# Patient Record
Sex: FEMALE | Race: WHITE | NOT HISPANIC OR LATINO | URBAN - METROPOLITAN AREA
[De-identification: names, ages, dates, MRNs, and addresses within clinical notes are randomized per-mention and may not be internally consistent; named-entity substitution may affect disease eponyms.]

---

## 2017-01-18 ENCOUNTER — IMPORTED ENCOUNTER (OUTPATIENT)
Dept: URBAN - METROPOLITAN AREA CLINIC 31 | Facility: CLINIC | Age: 82
End: 2017-01-18

## 2017-01-18 PROBLEM — H17.89: Noted: 2017-01-18

## 2017-01-18 PROBLEM — H25.813: Noted: 2017-01-18

## 2017-01-18 PROCEDURE — 92014 COMPRE OPH EXAM EST PT 1/>: CPT

## 2017-01-18 NOTE — PATIENT DISCUSSION
1.  S/P  Lasik: monitor2. Discussed the risks benefits alternatives and limitations of cataract surgery including infection bleeding loss of vision retinal tears detachment. The patient stated a full understanding and a desire to proceed with the procedure in both eyes. Refractive options were reviewed. Patient has elected to be optimized for distance vision in both eyes. The patient will still need glasses for reading and to possibly fine tune distance vision. 3. Return for an appointment in 1 month for cataract evaluation. with Dr. Evangelista Laughter.

## 2017-03-01 ENCOUNTER — IMPORTED ENCOUNTER (OUTPATIENT)
Dept: URBAN - METROPOLITAN AREA CLINIC 31 | Facility: CLINIC | Age: 82
End: 2017-03-01

## 2017-03-01 PROBLEM — H25.811: Noted: 2017-03-01

## 2017-03-01 PROBLEM — H17.89: Noted: 2017-03-01

## 2017-03-01 PROBLEM — H25.812: Noted: 2017-03-01

## 2017-03-01 PROCEDURE — 99214 OFFICE O/P EST MOD 30 MIN: CPT

## 2017-03-01 PROCEDURE — 92015 DETERMINE REFRACTIVE STATE: CPT

## 2017-03-01 NOTE — PATIENT DISCUSSION
1.  Cataract OD: Discussed the risks benefits alternatives and limitations of cataract surgery including infection bleeding loss of vision retinal tears detachment. The patient stated a full understanding and a desire to proceed with the procedure in the right eye. Refractive options reviewed. Patient understands IOL calcs are more difficult and that intraoperative ORA measurements will increase the success but does not guarantee not needing a refractive enhancement. Pt desired to schedule surgery OD with ORA guidance. 2. Combined Types of Cataract OS: Explained how cataracts can effect vision. Recommend clinical observation. The patient was advised to contact us if any change or worsening of vision. 3. S/P  Lasik: distance ou.4. Return for an appointment for [address] [place] [address] with Dr. Danyell Stephenson.

## 2017-03-22 ENCOUNTER — IMPORTED ENCOUNTER (OUTPATIENT)
Dept: URBAN - METROPOLITAN AREA CLINIC 31 | Facility: CLINIC | Age: 82
End: 2017-03-22

## 2017-03-22 PROBLEM — H25.811: Noted: 2017-03-22

## 2017-03-22 PROBLEM — H25.812: Noted: 2017-03-22

## 2017-03-22 PROBLEM — H17.89: Noted: 2017-03-22

## 2017-03-22 PROCEDURE — 92132 CPTRZD OPH DX IMG ANT SGM: CPT

## 2017-03-22 PROCEDURE — 76519 ECHO EXAM OF EYE: CPT

## 2017-03-22 NOTE — PATIENT DISCUSSION
1.  Cataract OD: Discussed the risks benefits alternatives and limitations of cataract surgery including infection bleeding loss of vision retinal tears detachment. The patient stated a full understanding and a desire to proceed with the procedure in the right eye. Refractive options reviewed. Patient understands IOL calcs are more difficult and that intraoperative ORA measurements will increase the success but does not guarantee not needing a refractive enhancement. Pt desired to schedule surgery OD with ORA guidance. 2. Combined Types of Cataract OS: Explained how cataracts can effect vision. Recommend clinical observation. The patient was advised to contact us if any change or worsening of vision. 3. S/P  Lasik: distance ou.

## 2017-04-11 ENCOUNTER — IMPORTED ENCOUNTER (OUTPATIENT)
Dept: URBAN - METROPOLITAN AREA CLINIC 31 | Facility: CLINIC | Age: 82
End: 2017-04-11

## 2017-04-11 PROBLEM — Z96.1: Noted: 2017-04-11

## 2017-04-11 PROCEDURE — 99024 POSTOP FOLLOW-UP VISIT: CPT

## 2017-04-11 NOTE — PATIENT DISCUSSION
1.  Post-Op Day #1 - Cataract Surgery Right Eye (OD) - doing well. Tears prn. Continue postop drops as directed. Call office with symptoms of pain redness or decreased vision in operative eye. 1 drop zylet instilled. 2. Return for an appointment in 1 week for post op exam. What to do with Dr. Denver Curb.

## 2017-04-18 ENCOUNTER — IMPORTED ENCOUNTER (OUTPATIENT)
Dept: URBAN - METROPOLITAN AREA CLINIC 31 | Facility: CLINIC | Age: 82
End: 2017-04-18

## 2017-04-18 PROBLEM — H25.813: Noted: 2017-04-18

## 2017-04-18 PROBLEM — H25.812: Noted: 2017-04-18

## 2017-04-18 PROBLEM — Z96.1: Noted: 2017-04-18

## 2017-04-18 PROBLEM — H17.89: Noted: 2017-04-18

## 2017-04-18 PROCEDURE — 99024 POSTOP FOLLOW-UP VISIT: CPT

## 2017-04-18 NOTE — PATIENT DISCUSSION
1.  Post-Op Week #1 - Cataract Surgery Right Eye (OD) - Intraocular lens stable and surgery very well healed. Patient to resume all normal activities. Finish postop drops as directed. Patient happy with uncorrected mono. 2. S/P  Lasik: monitor 3. Combined Types of Cataract OS: Explained how cataracts can effect vision. Recommend clinical observation. The patient was advised to contact us if any change or worsening of vision. 4. Return for an appointment in 6 months for comprehensive exam with Dr. Cara De La Garza. Leaving for up [place]SHUT[place] Corrigan. To call if any problems.

## 2017-11-16 ENCOUNTER — IMPORTED ENCOUNTER (OUTPATIENT)
Dept: URBAN - METROPOLITAN AREA CLINIC 31 | Facility: CLINIC | Age: 82
End: 2017-11-16

## 2017-11-16 PROBLEM — H25.812: Noted: 2017-11-16

## 2017-11-16 PROBLEM — Z96.1: Noted: 2017-11-16

## 2017-11-16 PROCEDURE — 92014 COMPRE OPH EXAM EST PT 1/>: CPT

## 2017-11-16 NOTE — PATIENT DISCUSSION
1. Combined Types of Cataract OS: Explained how cataracts can effect vision. Recommend clinical observation. The patient was advised to contact us if any change or worsening of vision. 2. Pseudophakia OD - IOL stable. Happy with near [place] uncorrected. Monitor. 3. Return for an appointment in 1 year for comprehensive exam. with Dr. Dwain Morrison.

## 2019-01-16 ENCOUNTER — IMPORTED ENCOUNTER (OUTPATIENT)
Dept: URBAN - METROPOLITAN AREA CLINIC 31 | Facility: CLINIC | Age: 84
End: 2019-01-16

## 2019-01-16 PROBLEM — Z96.1: Noted: 2019-01-16

## 2019-01-16 PROBLEM — H25.812: Noted: 2019-01-16

## 2019-01-16 PROCEDURE — 92014 COMPRE OPH EXAM EST PT 1/>: CPT

## 2019-01-16 NOTE — PATIENT DISCUSSION
1. Combined Types of Cataract OS: monitor2. Pseudophakia OD - IOL stable. Happy with near [place] uncorrected. Monitor. 3. Return for an appointment in 1 year for comprehensive exam. with Dr. Angie Serrano.

## 2020-01-17 ENCOUNTER — IMPORTED ENCOUNTER (OUTPATIENT)
Dept: URBAN - METROPOLITAN AREA CLINIC 31 | Facility: CLINIC | Age: 85
End: 2020-01-17

## 2020-01-17 PROBLEM — H25.812: Noted: 2020-01-17

## 2020-01-17 PROBLEM — Z96.1: Noted: 2020-01-17

## 2020-01-17 PROCEDURE — 92014 COMPRE OPH EXAM EST PT 1/>: CPT

## 2020-01-17 PROCEDURE — 92015 DETERMINE REFRACTIVE STATE: CPT

## 2020-01-17 NOTE — PATIENT DISCUSSION
1. Combined Types of Cataract OS: consult FP. 2. Pseudophakia OD - IOL stable. Monitor for changes in vision. 3. Return for an appointment in 1 month for cataract evaluation. with Dr. Terra Shanks.

## 2020-03-04 ENCOUNTER — IMPORTED ENCOUNTER (OUTPATIENT)
Dept: URBAN - METROPOLITAN AREA CLINIC 31 | Facility: CLINIC | Age: 85
End: 2020-03-04

## 2020-03-04 PROBLEM — H17.89: Noted: 2020-03-04

## 2020-03-04 PROBLEM — Z96.1: Noted: 2020-03-04

## 2020-03-04 PROBLEM — H25.812: Noted: 2020-03-04

## 2020-03-04 PROBLEM — H43.812: Noted: 2020-03-04

## 2020-03-04 PROCEDURE — 99213 OFFICE O/P EST LOW 20 MIN: CPT

## 2020-03-04 NOTE — PATIENT DISCUSSION
1.  Cataract OS: Discussed the risks benefits alternatives and limitations of cataract surgery including infection bleeding loss of vision retinal tears detachment. The patient stated a full understanding and a desire to proceed with the procedure in the left eye. Refractive options reviewed. Patient understands IOL calcs are more difficult and that intraoperative ORA measurements will increase the success but does not guarantee not needing a refractive enhancement. Pt desired to schedule surgery OS with ORA guidance. pore plano2. Pseudophakia OD - IOL stable. Monitor for changes in vision. 3. PVD OS: Patient was cautioned to call our office immediately if they experience a substantial change in their symptoms such as an increase in floaters persistent flashes loss of visual field (may appear as a shadow or a curtain) or decrease in visual acuity as these may indicate a retinal tear or detachment. If this is a new problem patient will need to return for re-examination  as determined by the [address] [place] [address]. S/P  Lasik: monovision OD reading OS distance5. Return for an appointment for [address] [place] [address] with Dr. Gia Faye.

## 2020-03-11 ENCOUNTER — IMPORTED ENCOUNTER (OUTPATIENT)
Dept: URBAN - METROPOLITAN AREA CLINIC 31 | Facility: CLINIC | Age: 85
End: 2020-03-11

## 2020-03-11 PROBLEM — H25.812: Noted: 2020-03-11

## 2020-03-11 PROCEDURE — 92136 OPHTHALMIC BIOMETRY: CPT

## 2020-03-11 NOTE — PATIENT DISCUSSION
The patient has elected to have a Toric IOL to correct astigmatism in the left eye. Intraoperative ORA measurements and Femtolaser assist will be performed. The possible need for refractive enhancement was reviewed. There is no guarantee of perfect uncorrected acuity.

## 2020-05-12 ENCOUNTER — IMPORTED ENCOUNTER (OUTPATIENT)
Dept: URBAN - METROPOLITAN AREA CLINIC 31 | Facility: CLINIC | Age: 85
End: 2020-05-12

## 2020-05-12 PROBLEM — Z96.1: Noted: 2020-05-12

## 2020-05-12 PROCEDURE — 99024 POSTOP FOLLOW-UP VISIT: CPT

## 2020-05-12 NOTE — PATIENT DISCUSSION
1.  Post-Op Day #1 - Cataract Surgery Left Eye (OS) - doing well. Tears prn. Continue postop drops as directed. Call office with symptoms of pain redness or decreased vision in operative eye.  1 drop of tobramycin instilled2. Return for an appointment in 1 week for post op exam. with Dr. Saleem Fish.

## 2020-05-20 ENCOUNTER — IMPORTED ENCOUNTER (OUTPATIENT)
Dept: URBAN - METROPOLITAN AREA CLINIC 31 | Facility: CLINIC | Age: 85
End: 2020-05-20

## 2020-05-20 PROBLEM — Z96.1: Noted: 2020-05-20

## 2020-05-20 PROCEDURE — 99024 POSTOP FOLLOW-UP VISIT: CPT

## 2020-05-20 NOTE — PATIENT DISCUSSION
1.  Post-Op Week #1 - Cataract Surgery Left Eye (OS) -  Intraocular lens stable and surgery very well healed. Patient to resume all normal activities. Finish postop drops as directed. Final Refraction given if necessary. Call with any problems. 2. Return for an appointment in 4 months for dilated fundus exam. with Dr. Grey Alvarez.

## 2020-10-21 ENCOUNTER — IMPORTED ENCOUNTER (OUTPATIENT)
Dept: URBAN - METROPOLITAN AREA CLINIC 31 | Facility: CLINIC | Age: 85
End: 2020-10-21

## 2020-10-21 PROBLEM — Z96.1: Noted: 2020-10-21

## 2020-10-21 PROBLEM — H43.813: Noted: 2020-10-21

## 2020-10-21 PROCEDURE — 99214 OFFICE O/P EST MOD 30 MIN: CPT

## 2020-10-21 NOTE — PATIENT DISCUSSION
1.  Pseudophakia OU - IOLs stable. Monitor for changes in vision. 2. PVD OU:  Patient was cautioned to call our office immediately if they experience a substantial change in their symptoms such as an increase in floaters persistent flashes loss of visual field (may appear as a shadow or a curtain) or decrease in visual acuity as these may indicate a retinal tear or detachment. If this is a new problem patient will need to return for re-examination  as determined by the [address]. Return for an appointment in 1 year for comprehensive exam. with Dr. Maura Moya.

## 2021-10-01 ENCOUNTER — OFFICE VISIT (OUTPATIENT)
Dept: URBAN - METROPOLITAN AREA CLINIC 121 | Facility: CLINIC | Age: 86
End: 2021-10-01

## 2021-10-21 ENCOUNTER — IMPORTED ENCOUNTER (OUTPATIENT)
Dept: URBAN - METROPOLITAN AREA CLINIC 31 | Facility: CLINIC | Age: 86
End: 2021-10-21

## 2021-10-21 PROBLEM — Z96.1: Noted: 2021-10-21

## 2021-10-21 PROBLEM — H43.813: Noted: 2021-10-21

## 2021-10-21 PROCEDURE — 99214 OFFICE O/P EST MOD 30 MIN: CPT

## 2021-10-21 NOTE — PATIENT DISCUSSION
1.  Pseudophakia OU - IOLs stable. Monitor for changes in vision. 2. PVD OU:  Monitor. 3. Return for an appointment in 1 year for comprehensive exam. with Dr. Angie Serrano.

## 2022-03-29 ENCOUNTER — OFFICE VISIT (OUTPATIENT)
Dept: URBAN - METROPOLITAN AREA CLINIC 63 | Facility: CLINIC | Age: 87
End: 2022-03-29

## 2022-04-02 ASSESSMENT — VISUAL ACUITY
OD_CC: 20/250
OS_CC: 20/30
OD_CC: 20/50
OD_SC: 20/40
OS_CC: 20/40
OS_CC: 20/40
OS_CC: 20/30-2
OS_CC: 20/30
OD_PH: SC 20/30 -2
OS_GLARE: 20/50-2MED
OD_CC: 20/25-2
OD_SC: 20/25
OS_CC: 20/30
OS_SC: 20/50
OU_SC: 20/40
OS_PH: SC 20/30 -2
OS_CC: 20/30-2
OS_CC: 20/40
OU_CC: 20/40
OD_SC: 20/30
OS_CC: 20/30-1
OS_CC: 20/40+2
OD_CC: 20/40+2
OD_SC: J116''
OD_CC: 20/30+2
OD_CC: 20/70-2
OD_SC: 20/40
OD_CC: 20/70
OU_CC: 20/30
OD_PH: SC 20/50
OD_SC: 20/30
OU_CC: 20/30
OD_SC: 20/25
OS_CC: 20/40-1
OD_GLARE: 20/50MED
OS_CC: 20/40
OS_PH: SC 20/30
OS_GLARE: 20/50MED
OD_SC: 20/30-3
OS_CC: 20/30

## 2022-04-02 ASSESSMENT — TONOMETRY
OD_IOP_MMHG: 11
OD_IOP_MMHG: 15
OS_IOP_MMHG: 13
OS_IOP_MMHG: 10
OS_IOP_MMHG: 10
OS_IOP_MMHG: 15
OS_IOP_MMHG: 7
OS_IOP_MMHG: 10
OS_IOP_MMHG: 11
OD_IOP_MMHG: 10
OD_IOP_MMHG: 12
OS_IOP_MMHG: 12
OS_IOP_MMHG: 13
OS_IOP_MMHG: 12
OS_IOP_MMHG: 12
OD_IOP_MMHG: 12
OD_IOP_MMHG: 12
OD_IOP_MMHG: 10
OD_IOP_MMHG: 10

## 2022-04-04 ENCOUNTER — LAB OUTSIDE AN ENCOUNTER (OUTPATIENT)
Dept: URBAN - METROPOLITAN AREA CLINIC 121 | Facility: CLINIC | Age: 87
End: 2022-04-04

## 2022-04-07 LAB
A/G RATIO: 2.3
ALBUMIN: (no result)
ALKALINE PHOSPHATASE: (no result)
ALT (SGPT): (no result)
AMBIG ABBREV CMP14 DEFAULT: (no result)
AST (SGOT): (no result)
BILIRUBIN, TOTAL: (no result)
BUN/CREATININE RATIO: 21
BUN: (no result)
C-REACTIVE PROTEIN, QUANT: (no result)
CALCIUM: (no result)
CARBON DIOXIDE, TOTAL: (no result)
CHLORIDE: (no result)
CREATININE: (no result)
EGFR: (no result)
ENDOMYSIAL ANTIBODY IGA: NEGATIVE
GLOBULIN, TOTAL: (no result)
GLUCOSE: (no result)
IMMUNOGLOBULIN A, QN, SERUM: (no result)
POTASSIUM: (no result)
PROTEIN, TOTAL: (no result)
SODIUM: (no result)
T-TRANSGLUTAMINASE (TTG) IGA: (no result)

## 2022-04-19 ENCOUNTER — OFFICE VISIT (OUTPATIENT)
Dept: URBAN - METROPOLITAN AREA CLINIC 63 | Facility: CLINIC | Age: 87
End: 2022-04-19

## 2022-07-09 ENCOUNTER — TELEPHONE ENCOUNTER (OUTPATIENT)
Dept: URBAN - METROPOLITAN AREA CLINIC 121 | Facility: CLINIC | Age: 87
End: 2022-07-09

## 2022-07-09 RX ORDER — DENOSUMAB 60 MG/ML
EVERY 6 MONTHS INJECTION SUBCUTANEOUS TAKE AS DIRECTED
Refills: 0 | OUTPATIENT
Start: 2022-03-29 | End: 2022-04-19

## 2022-07-09 RX ORDER — ROSUVASTATIN CALCIUM 40 MG/1
TABLET, FILM COATED ORAL
Refills: 0 | OUTPATIENT
Start: 2022-01-31 | End: 2022-03-29

## 2022-07-09 RX ORDER — ROSUVASTATIN CALCIUM 40 MG/1
TABLET, FILM COATED ORAL ONCE A DAY
Refills: 0 | OUTPATIENT
Start: 2022-03-29 | End: 2022-03-29

## 2022-07-09 RX ORDER — ANTIARTHRITIC COMBINATION NO.2 900 MG
TABLET ORAL ONCE A DAY
Refills: 0 | OUTPATIENT
Start: 2022-03-29 | End: 2022-04-19

## 2022-07-09 RX ORDER — ROSUVASTATIN CALCIUM 20 MG/1
TABLET, FILM COATED ORAL ONCE A DAY
Refills: 0 | OUTPATIENT
Start: 2022-03-29 | End: 2022-04-19

## 2022-07-09 RX ORDER — FLUTICASONE PROPIONATE 50 UG/1
SPRAY, METERED NASAL
Refills: 0 | OUTPATIENT
Start: 2022-02-15 | End: 2022-03-29

## 2022-07-09 RX ORDER — CETIRIZINE HYDROCHLORIDE 10 MG/1
TABLET, FILM COATED ORAL ONCE A DAY
Refills: 0 | OUTPATIENT
Start: 2022-03-29 | End: 2022-03-29

## 2022-07-09 RX ORDER — CETIRIZINE HYDROCHLORIDE 10 MG/1
TABLET, FILM COATED ORAL
Refills: 0 | OUTPATIENT
Start: 2022-02-15 | End: 2022-03-29

## 2022-07-09 RX ORDER — FLUTICASONE PROPIONATE 50 UG/1
SPRAY, METERED NASAL TAKE AS DIRECTED
Refills: 0 | OUTPATIENT
Start: 2022-03-29 | End: 2022-04-19

## 2022-07-10 ENCOUNTER — TELEPHONE ENCOUNTER (OUTPATIENT)
Dept: URBAN - METROPOLITAN AREA CLINIC 121 | Facility: CLINIC | Age: 87
End: 2022-07-10

## 2022-07-10 RX ORDER — ANTIARTHRITIC COMBINATION NO.2 900 MG
TABLET ORAL ONCE A DAY
Refills: 0 | Status: ACTIVE | COMMUNITY
Start: 2022-04-19

## 2022-07-10 RX ORDER — ROSUVASTATIN CALCIUM 20 MG/1
TABLET, FILM COATED ORAL ONCE A DAY
Refills: 0 | Status: ACTIVE | COMMUNITY
Start: 2022-04-19

## 2022-07-10 RX ORDER — DENOSUMAB 60 MG/ML
EVERY 6 MONTHS INJECTION SUBCUTANEOUS TAKE AS DIRECTED
Refills: 0 | Status: ACTIVE | COMMUNITY
Start: 2022-04-19

## 2022-07-10 RX ORDER — FLUTICASONE PROPIONATE 50 UG/1
SPRAY, METERED NASAL TAKE AS DIRECTED
Refills: 0 | Status: ACTIVE | COMMUNITY
Start: 2022-04-19

## 2023-04-12 ENCOUNTER — COMPREHENSIVE EXAM (OUTPATIENT)
Dept: URBAN - METROPOLITAN AREA CLINIC 29 | Facility: CLINIC | Age: 88
End: 2023-04-12

## 2023-04-12 DIAGNOSIS — H43.813: ICD-10-CM

## 2023-04-12 DIAGNOSIS — Z96.1: ICD-10-CM

## 2023-04-12 PROCEDURE — 99214 OFFICE O/P EST MOD 30 MIN: CPT

## 2023-04-12 ASSESSMENT — VISUAL ACUITY
OD_SC: 20/20
OS_SC: 20/30+2
OD_SC: 20/150-1

## 2023-04-12 ASSESSMENT — TONOMETRY
OD_IOP_MMHG: 10
OS_IOP_MMHG: 10

## 2024-04-05 ENCOUNTER — COMPREHENSIVE EXAM (OUTPATIENT)
Dept: URBAN - METROPOLITAN AREA CLINIC 29 | Facility: CLINIC | Age: 89
End: 2024-04-05

## 2024-04-05 DIAGNOSIS — Z96.1: ICD-10-CM

## 2024-04-05 DIAGNOSIS — H43.813: ICD-10-CM

## 2024-04-05 PROCEDURE — 92014 COMPRE OPH EXAM EST PT 1/>: CPT

## 2024-04-05 ASSESSMENT — TONOMETRY
OD_IOP_MMHG: 12
OS_IOP_MMHG: 12

## 2024-04-05 ASSESSMENT — VISUAL ACUITY
OD_SC: 20/25
OS_SC: 20/30

## 2025-04-08 ENCOUNTER — COMPREHENSIVE EXAM (OUTPATIENT)
Age: 89
End: 2025-04-08

## 2025-04-08 DIAGNOSIS — H43.813: ICD-10-CM

## 2025-04-08 DIAGNOSIS — Z96.1: ICD-10-CM

## 2025-04-08 PROCEDURE — 99214 OFFICE O/P EST MOD 30 MIN: CPT

## 2025-05-28 ENCOUNTER — EMERGENCY VISIT (OUTPATIENT)
Age: 89
End: 2025-05-28

## 2025-05-28 DIAGNOSIS — Z96.1: ICD-10-CM

## 2025-05-28 DIAGNOSIS — H43.813: ICD-10-CM

## 2025-05-28 DIAGNOSIS — H01.114: ICD-10-CM

## 2025-05-28 DIAGNOSIS — H01.111: ICD-10-CM

## 2025-05-28 PROCEDURE — 99214 OFFICE O/P EST MOD 30 MIN: CPT

## 2025-05-28 RX ORDER — FLUOROMETHOLONE 1 MG/G: 1 OINTMENT OPHTHALMIC
